# Patient Record
Sex: MALE
[De-identification: names, ages, dates, MRNs, and addresses within clinical notes are randomized per-mention and may not be internally consistent; named-entity substitution may affect disease eponyms.]

---

## 2019-01-25 ENCOUNTER — HOSPITAL ENCOUNTER (OUTPATIENT)
Dept: HOSPITAL 31 - C.PAT | Age: 49
End: 2019-01-25
Payer: COMMERCIAL

## 2019-01-25 DIAGNOSIS — Z01.818: Primary | ICD-10-CM

## 2019-02-04 ENCOUNTER — HOSPITAL ENCOUNTER (OUTPATIENT)
Dept: HOSPITAL 31 - C.SDS | Age: 49
Discharge: HOME | End: 2019-02-04
Attending: SURGERY
Payer: COMMERCIAL

## 2019-02-04 VITALS — SYSTOLIC BLOOD PRESSURE: 121 MMHG | DIASTOLIC BLOOD PRESSURE: 69 MMHG | OXYGEN SATURATION: 98 %

## 2019-02-04 VITALS — HEART RATE: 81 BPM | RESPIRATION RATE: 15 BRPM

## 2019-02-04 VITALS — TEMPERATURE: 97.1 F

## 2019-02-04 VITALS — BODY MASS INDEX: 25.7 KG/M2

## 2019-02-04 DIAGNOSIS — L72.3: Primary | ICD-10-CM

## 2019-02-04 PROCEDURE — 88304 TISSUE EXAM BY PATHOLOGIST: CPT

## 2019-02-04 PROCEDURE — 12044 INTMD RPR N-HF/GENIT7.6-12.5: CPT

## 2019-02-04 PROCEDURE — 82948 REAGENT STRIP/BLOOD GLUCOSE: CPT

## 2019-02-04 PROCEDURE — 11426 EXC H-F-NK-SP B9+MARG >4 CM: CPT

## 2019-02-04 NOTE — [id number].[id number]
Surgeon's Initial Post Op Note





- Surgeon's Notes


Surgeon: MD Delmi


Assistant: Missy PGY3


Pre-Operative Diagnosis: Left posterior neck mass


Operative Findings: caseous cyst


Post-Operative Diagnosis: Left posterior neck mass


Operation Performed: Excsion of left posterior neck mass


Specimen/Specimens Removed: Left posterior neck mass


Estimated Blood Loss: EBL {In ML}: 5


Date of Surgery/Procedure: 02/04/19


Time of Surgery/Procedure: 13:28

## 2019-02-05 NOTE — OP
PROCEDURE DATE:  02/04/2019



PREOPERATIVE DIAGNOSES:

1.  Posterior neck lipoma, possible sebaceous cyst.

2.  Scar of the previous lipoma excision.



POSTOPERATIVE DIAGNOSES:

1.  Recurrent posterior neck large sebaceous cyst, approximately 5 x 4 cm

size.

2.  Scar of previous operation.



OPERATIONS DONE:

1.  Excision of a large sebaceous cyst of posterior neck, 5 x 4 cm size.

2.  Excision of the redundant skin with old scar tissue, approximately 5 x

3 cm size.

3.  Multi-layered complex closure of the wound, 5 x 4 cm size to prevent

seroma and hematoma.



ANESTHESIA:  General anesthesia with LMA.



ESTIMATED BLOOD LOSS:  Around 20 mL.



DRAINS:  None.



PATHOLOGY:  The sebaceous cyst and the redundant skin with old scar was

sent for the pathology.



COMPLICATIONS:  None.



INTRAOPERATIVE FINDINGS:  The patient had approximately 5 x 4 cm sebaceous

cyst of posterior neck with scar of the old surgery, and it seems like the

patient had previous cyst removal, not the lipoma excision and this one was

a recurrent cyst.



DESCRIPTION OF PROCEDURE:  On intraoperative steps, this is a 49-year-old

male who was initially diagnosed with lipoma of the posterior neck

recurrent due to previous operation at the same place.  The patient was

consented for excision, bought to the OR, placed supine on the operating

table.  After induction of the anesthesia, the patient was placed in left

lateral position.  The posterior neck was prepped and draped in the usual

sterile fashion.  Local anesthesia was injected.  Elliptical incision was

made, and old scar was included.  Upper and lower flaps were created.  The

patient was found to have a cyst instead of lipoma, and now the dissection

was carried down deep upto the underlying fascia as well as the muscle. 

The cyst appeared to be extremely large, and the cyst was completely

excised.  Now, the patient had a redundant skin covering the cyst that was

also excised.  It was approximately 5 x 3 cm size.  Now, the wound was

irrigated.  Hemostasis was achieved to prevent seroma as well as a hematoma

in the dead space in the neck.  The complex wound closure was done.  Upper

and lower flaps were sutured to the underlying muscle as well as the

fascia, the deep subcu with a 2-0 Vicryl, superficial subcu with a 3-0

Vicryl, and skin with a 4-0 Monocryl.  Dry sterile dressing was applied. 

The patient tolerated the procedure well.  Count of instrument and gauze

was correct.  There was no apparent complication.  The patient was reversed

from the anesthesia in OR and sent to the postanesthesia care in stable

condition.







__________________________________________

Akin Awad MD





DD:  02/04/2019 15:35:02

DT:  02/04/2019 21:33:25

Job # [id number]

## 2021-07-23 ENCOUNTER — PREPPED CHART (OUTPATIENT)
Dept: URBAN - METROPOLITAN AREA CLINIC 110 | Facility: CLINIC | Age: 51
End: 2021-07-23

## 2021-07-23 PROBLEM — E11.9 DIABETES, TYPE II, NO OCULAR COMPLICATIONS: Noted: 2021-07-23

## 2021-07-23 PROBLEM — H04.123 DRY EYE SYNDROME: Noted: 2021-07-23

## 2021-07-23 PROBLEM — H25.811 COMBINED SENILE CATARACT: Noted: 2021-07-23

## 2021-07-23 PROBLEM — H40.013 GLAUCOMA SUSPECT, LOW RISK: Noted: 2021-07-23

## 2021-07-23 PROBLEM — H40.023 GLAUCOMA SUSPECT, HIGH RISK: Noted: 2021-01-22

## 2021-07-23 PROBLEM — H25.813 COMBINED SENILE CATARACT: Noted: 2021-07-23

## 2021-09-09 ENCOUNTER — FOLLOW UP (OUTPATIENT)
Dept: URBAN - METROPOLITAN AREA CLINIC 110 | Facility: CLINIC | Age: 51
End: 2021-09-09

## 2021-09-09 DIAGNOSIS — H25.813: ICD-10-CM

## 2021-09-09 DIAGNOSIS — E11.9: ICD-10-CM

## 2021-09-09 DIAGNOSIS — H40.013: ICD-10-CM

## 2021-09-09 DIAGNOSIS — H04.123: ICD-10-CM

## 2021-09-09 PROCEDURE — 92136 OPHTHALMIC BIOMETRY: CPT

## 2021-09-09 PROCEDURE — 99214 OFFICE O/P EST MOD 30 MIN: CPT

## 2021-09-09 PROCEDURE — 92020 GONIOSCOPY: CPT

## 2021-09-09 ASSESSMENT — VISUAL ACUITY
OD_CC: 20/20-3
OS_CC: 20/40

## 2021-09-09 ASSESSMENT — TONOMETRY
OD_IOP_MMHG: 18
OS_IOP_MMHG: 18

## 2021-11-18 ENCOUNTER — SURGERY/PROCEDURE (OUTPATIENT)
Dept: URBAN - METROPOLITAN AREA SURGICAL CENTER 32 | Facility: SURGICAL CENTER | Age: 51
End: 2021-11-18

## 2021-11-18 DIAGNOSIS — H25.813: ICD-10-CM

## 2021-11-18 PROCEDURE — 66984 XCAPSL CTRC RMVL W/O ECP: CPT

## 2021-11-19 ENCOUNTER — 1 DAY POST-OP (OUTPATIENT)
Dept: URBAN - METROPOLITAN AREA CLINIC 110 | Facility: CLINIC | Age: 51
End: 2021-11-19

## 2021-11-19 DIAGNOSIS — Z96.1: ICD-10-CM

## 2021-11-19 PROCEDURE — 99024 POSTOP FOLLOW-UP VISIT: CPT

## 2021-11-19 ASSESSMENT — VISUAL ACUITY
OS_SC: J16
OS_SC: 20/25

## 2021-11-19 ASSESSMENT — TONOMETRY: OS_IOP_MMHG: 20

## 2021-11-26 ENCOUNTER — 1 WEEK POST-OP (OUTPATIENT)
Dept: URBAN - METROPOLITAN AREA CLINIC 110 | Facility: CLINIC | Age: 51
End: 2021-11-26

## 2021-11-26 DIAGNOSIS — H25.811: ICD-10-CM

## 2021-11-26 DIAGNOSIS — Z96.1: ICD-10-CM

## 2021-11-26 PROCEDURE — 99024 POSTOP FOLLOW-UP VISIT: CPT

## 2021-11-26 ASSESSMENT — TONOMETRY
OD_IOP_MMHG: 16
OS_IOP_MMHG: 18

## 2021-11-26 ASSESSMENT — VISUAL ACUITY: OS_SC: 20/20

## 2021-12-16 ENCOUNTER — 1 MONTH POST-OP (OUTPATIENT)
Dept: URBAN - METROPOLITAN AREA CLINIC 110 | Facility: CLINIC | Age: 51
End: 2021-12-16

## 2021-12-16 DIAGNOSIS — H25.811: ICD-10-CM

## 2021-12-16 DIAGNOSIS — Z96.1: ICD-10-CM

## 2021-12-16 PROCEDURE — 99024 POSTOP FOLLOW-UP VISIT: CPT

## 2021-12-16 ASSESSMENT — TONOMETRY
OD_IOP_MMHG: 17
OS_IOP_MMHG: 15

## 2021-12-16 ASSESSMENT — VISUAL ACUITY
OS_SC: 20/20
OD_SC: 20/40

## 2022-03-31 ENCOUNTER — FOLLOW UP (OUTPATIENT)
Dept: URBAN - METROPOLITAN AREA CLINIC 110 | Facility: CLINIC | Age: 52
End: 2022-03-31

## 2022-03-31 DIAGNOSIS — Z96.1: ICD-10-CM

## 2022-03-31 DIAGNOSIS — H25.811: ICD-10-CM

## 2022-03-31 DIAGNOSIS — H40.013: ICD-10-CM

## 2022-03-31 DIAGNOSIS — H04.123: ICD-10-CM

## 2022-03-31 DIAGNOSIS — E11.9: ICD-10-CM

## 2022-03-31 PROCEDURE — 92012 INTRM OPH EXAM EST PATIENT: CPT

## 2022-03-31 PROCEDURE — 92133 CPTRZD OPH DX IMG PST SGM ON: CPT

## 2022-03-31 ASSESSMENT — VISUAL ACUITY
OS_CC: 20/20-2
OU_CC: J1+
OD_CC: 20/20

## 2022-03-31 ASSESSMENT — TONOMETRY
OD_IOP_MMHG: 20
OS_IOP_MMHG: 18

## 2022-06-15 ENCOUNTER — ESTABLISHED COMPREHENSIVE EXAM (OUTPATIENT)
Dept: URBAN - METROPOLITAN AREA CLINIC 21 | Facility: CLINIC | Age: 52
End: 2022-06-15

## 2022-06-15 DIAGNOSIS — E11.9: ICD-10-CM

## 2022-06-15 DIAGNOSIS — H40.013: ICD-10-CM

## 2022-06-15 DIAGNOSIS — H25.811: ICD-10-CM

## 2022-06-15 DIAGNOSIS — Z96.1: ICD-10-CM

## 2022-06-15 PROCEDURE — 92133 CPTRZD OPH DX IMG PST SGM ON: CPT

## 2022-06-15 PROCEDURE — 92014 COMPRE OPH EXAM EST PT 1/>: CPT

## 2022-06-15 ASSESSMENT — TONOMETRY
OS_IOP_MMHG: 14
OD_IOP_MMHG: 15

## 2022-06-15 ASSESSMENT — VISUAL ACUITY
OS_CC: 20/25
OD_CC: 20/20-2
OU_CC: J1+

## 2022-06-29 ENCOUNTER — FOLLOW UP (OUTPATIENT)
Dept: URBAN - METROPOLITAN AREA CLINIC 21 | Facility: CLINIC | Age: 52
End: 2022-06-29

## 2022-06-29 DIAGNOSIS — E11.9: ICD-10-CM

## 2022-06-29 DIAGNOSIS — Z96.1: ICD-10-CM

## 2022-06-29 DIAGNOSIS — H25.811: ICD-10-CM

## 2022-06-29 DIAGNOSIS — H40.013: ICD-10-CM

## 2022-06-29 PROCEDURE — 92012 INTRM OPH EXAM EST PATIENT: CPT

## 2022-06-29 PROCEDURE — 92083 EXTENDED VISUAL FIELD XM: CPT

## 2022-06-29 ASSESSMENT — VISUAL ACUITY
OS_CC: 20/20-1
OD_CC: 20/20-3

## 2022-06-29 ASSESSMENT — TONOMETRY
OD_IOP_MMHG: 18
OS_IOP_MMHG: 16

## 2023-02-15 ENCOUNTER — EYEGLASS CHECK (OUTPATIENT)
Dept: URBAN - METROPOLITAN AREA CLINIC 21 | Facility: CLINIC | Age: 53
End: 2023-02-15

## 2023-02-15 DIAGNOSIS — H40.013: ICD-10-CM

## 2023-02-15 DIAGNOSIS — H52.4: ICD-10-CM

## 2023-02-15 PROCEDURE — 92012 INTRM OPH EXAM EST PATIENT: CPT

## 2023-02-15 PROCEDURE — 92015 DETERMINE REFRACTIVE STATE: CPT

## 2023-02-15 ASSESSMENT — VISUAL ACUITY
OD_CC: J1+
OS_CC: J1+
OS_CC: 20/20-2
OD_CC: 20/25+2

## 2023-02-15 ASSESSMENT — TONOMETRY
OS_IOP_MMHG: 17
OD_IOP_MMHG: 18

## 2023-03-24 ENCOUNTER — EYEGLASS CHECK (OUTPATIENT)
Dept: URBAN - METROPOLITAN AREA CLINIC 21 | Facility: CLINIC | Age: 53
End: 2023-03-24

## 2023-03-24 DIAGNOSIS — H52.4: ICD-10-CM

## 2023-03-24 PROCEDURE — 92015 DETERMINE REFRACTIVE STATE: CPT | Mod: NC

## 2023-03-24 ASSESSMENT — VISUAL ACUITY
OD_CC: 20/30-1
OS_CC: 20/200

## 2023-08-22 ENCOUNTER — ESTABLISHED COMPREHENSIVE EXAM (OUTPATIENT)
Dept: URBAN - METROPOLITAN AREA CLINIC 21 | Facility: CLINIC | Age: 53
End: 2023-08-22

## 2023-08-22 DIAGNOSIS — H40.013: ICD-10-CM

## 2023-08-22 DIAGNOSIS — Z96.1: ICD-10-CM

## 2023-08-22 DIAGNOSIS — H52.4: ICD-10-CM

## 2023-08-22 DIAGNOSIS — H04.123: ICD-10-CM

## 2023-08-22 DIAGNOSIS — H25.811: ICD-10-CM

## 2023-08-22 DIAGNOSIS — E11.9: ICD-10-CM

## 2023-08-22 PROCEDURE — 92014 COMPRE OPH EXAM EST PT 1/>: CPT

## 2023-08-22 PROCEDURE — 92136 OPHTHALMIC BIOMETRY: CPT

## 2023-08-22 PROCEDURE — 92133 CPTRZD OPH DX IMG PST SGM ON: CPT

## 2023-08-22 PROCEDURE — 92025 CPTRIZED CORNEAL TOPOGRAPHY: CPT

## 2023-08-22 ASSESSMENT — TONOMETRY
OD_IOP_MMHG: 19
OS_IOP_MMHG: 17

## 2023-08-22 ASSESSMENT — VISUAL ACUITY
OD_CC: J1+
OD_CC: 20/60-2
OS_CC: 20/20
OS_CC: J1+

## 2024-01-18 ENCOUNTER — SURGERY/PROCEDURE (OUTPATIENT)
Dept: URBAN - METROPOLITAN AREA SURGICAL CENTER 32 | Facility: SURGICAL CENTER | Age: 54
End: 2024-01-18

## 2024-01-18 DIAGNOSIS — H25.811: ICD-10-CM

## 2024-01-18 PROCEDURE — 66984 XCAPSL CTRC RMVL W/O ECP: CPT

## 2024-01-19 ENCOUNTER — 1 DAY POST-OP (OUTPATIENT)
Dept: URBAN - METROPOLITAN AREA CLINIC 110 | Facility: CLINIC | Age: 54
End: 2024-01-19

## 2024-01-19 DIAGNOSIS — Z96.1: ICD-10-CM

## 2024-01-19 PROCEDURE — 99024 POSTOP FOLLOW-UP VISIT: CPT

## 2024-01-19 ASSESSMENT — TONOMETRY: OD_IOP_MMHG: 19

## 2024-01-19 ASSESSMENT — VISUAL ACUITY: OD_SC: 20/40+1

## 2024-01-26 ENCOUNTER — 1 WEEK POST-OP (OUTPATIENT)
Dept: URBAN - METROPOLITAN AREA CLINIC 110 | Facility: CLINIC | Age: 54
End: 2024-01-26

## 2024-01-26 DIAGNOSIS — Z96.1: ICD-10-CM

## 2024-01-26 PROCEDURE — 99024 POSTOP FOLLOW-UP VISIT: CPT

## 2024-01-26 ASSESSMENT — VISUAL ACUITY: OD_SC: 20/25-1

## 2024-01-26 ASSESSMENT — TONOMETRY: OD_IOP_MMHG: 14

## 2024-02-20 ENCOUNTER — 1 MONTH POST-OP (OUTPATIENT)
Dept: URBAN - METROPOLITAN AREA CLINIC 21 | Facility: CLINIC | Age: 54
End: 2024-02-20

## 2024-02-20 DIAGNOSIS — Z96.1: ICD-10-CM

## 2024-02-20 PROCEDURE — 99024 POSTOP FOLLOW-UP VISIT: CPT

## 2024-02-20 ASSESSMENT — TONOMETRY
OD_IOP_MMHG: 9
OS_IOP_MMHG: 9

## 2024-02-20 ASSESSMENT — VISUAL ACUITY
OS_SC: 20/25+1
OU_SC: 20/25
OD_SC: J5
OS_SC: J16
OD_SC: 20/30-2
OU_SC: J5

## 2024-08-30 ENCOUNTER — 6 MONTH FOLLOW UP (OUTPATIENT)
Dept: URBAN - METROPOLITAN AREA CLINIC 21 | Facility: CLINIC | Age: 54
End: 2024-08-30

## 2024-08-30 DIAGNOSIS — H43.813: ICD-10-CM

## 2024-08-30 DIAGNOSIS — E11.9: ICD-10-CM

## 2024-08-30 DIAGNOSIS — H04.123: ICD-10-CM

## 2024-08-30 DIAGNOSIS — H40.013: ICD-10-CM

## 2024-08-30 PROCEDURE — 92020 GONIOSCOPY: CPT

## 2024-08-30 PROCEDURE — 92201 OPSCPY EXTND RTA DRAW UNI/BI: CPT

## 2024-08-30 PROCEDURE — 92014 COMPRE OPH EXAM EST PT 1/>: CPT

## 2024-08-30 ASSESSMENT — TONOMETRY
OS_IOP_MMHG: 14
OD_IOP_MMHG: 13

## 2024-08-30 ASSESSMENT — VISUAL ACUITY
OS_SC: 20/20-2
OD_SC: 20/25-2

## 2025-09-05 ENCOUNTER — ESTABLISHED COMPREHENSIVE EXAM (OUTPATIENT)
Dept: URBAN - METROPOLITAN AREA CLINIC 21 | Facility: CLINIC | Age: 55
End: 2025-09-05

## 2025-09-05 DIAGNOSIS — H04.123: ICD-10-CM

## 2025-09-05 DIAGNOSIS — E11.9: ICD-10-CM

## 2025-09-05 DIAGNOSIS — H40.013: ICD-10-CM

## 2025-09-05 DIAGNOSIS — H43.813: ICD-10-CM

## 2025-09-05 PROCEDURE — 92014 COMPRE OPH EXAM EST PT 1/>: CPT

## 2025-09-05 PROCEDURE — 92133 CPTRZD OPH DX IMG PST SGM ON: CPT

## 2025-09-05 PROCEDURE — 92020 GONIOSCOPY: CPT

## 2025-09-05 ASSESSMENT — VISUAL ACUITY
OU_SC: 20/20-1
OU_CC: J1+
OD_SC: 20/50+3
OS_SC: 20/20

## 2025-09-05 ASSESSMENT — TONOMETRY
OD_IOP_MMHG: 15
OS_IOP_MMHG: 16